# Patient Record
Sex: FEMALE | Race: BLACK OR AFRICAN AMERICAN | NOT HISPANIC OR LATINO
[De-identification: names, ages, dates, MRNs, and addresses within clinical notes are randomized per-mention and may not be internally consistent; named-entity substitution may affect disease eponyms.]

---

## 2018-09-04 ENCOUNTER — TRANSCRIPTION ENCOUNTER (OUTPATIENT)
Age: 33
End: 2018-09-04

## 2018-12-20 ENCOUNTER — TRANSCRIPTION ENCOUNTER (OUTPATIENT)
Age: 33
End: 2018-12-20

## 2018-12-26 PROBLEM — Z00.00 ENCOUNTER FOR PREVENTIVE HEALTH EXAMINATION: Status: ACTIVE | Noted: 2018-12-26

## 2018-12-27 ENCOUNTER — APPOINTMENT (OUTPATIENT)
Dept: COLORECTAL SURGERY | Facility: CLINIC | Age: 33
End: 2018-12-27
Payer: MEDICAID

## 2018-12-27 VITALS
BODY MASS INDEX: 21.67 KG/M2 | OXYGEN SATURATION: 100 % | WEIGHT: 143 LBS | RESPIRATION RATE: 15 BRPM | HEART RATE: 71 BPM | DIASTOLIC BLOOD PRESSURE: 76 MMHG | HEIGHT: 68 IN | SYSTOLIC BLOOD PRESSURE: 120 MMHG

## 2018-12-27 DIAGNOSIS — K60.2 ANAL FISSURE, UNSPECIFIED: ICD-10-CM

## 2018-12-27 PROCEDURE — 99204 OFFICE O/P NEW MOD 45 MIN: CPT | Mod: 25

## 2018-12-27 PROCEDURE — 46600 DIAGNOSTIC ANOSCOPY SPX: CPT

## 2018-12-27 RX ORDER — NITROGLYCERIN 20 MG/G
2 OINTMENT TOPICAL
Qty: 1 | Refills: 3 | Status: ACTIVE | COMMUNITY
Start: 2018-12-27 | End: 1900-01-01

## 2018-12-29 RX ORDER — LIDOCAINE 5 G/100G
5 OINTMENT TOPICAL
Qty: 30 | Refills: 0 | Status: ACTIVE | COMMUNITY
Start: 2018-12-29 | End: 1900-01-01

## 2020-02-17 ENCOUNTER — TRANSCRIPTION ENCOUNTER (OUTPATIENT)
Age: 35
End: 2020-02-17

## 2020-02-28 ENCOUNTER — TRANSCRIPTION ENCOUNTER (OUTPATIENT)
Age: 35
End: 2020-02-28

## 2020-08-20 ENCOUNTER — TRANSCRIPTION ENCOUNTER (OUTPATIENT)
Age: 35
End: 2020-08-20

## 2021-11-09 ENCOUNTER — TRANSCRIPTION ENCOUNTER (OUTPATIENT)
Age: 36
End: 2021-11-09

## 2022-01-10 ENCOUNTER — TRANSCRIPTION ENCOUNTER (OUTPATIENT)
Age: 37
End: 2022-01-10

## 2022-01-12 ENCOUNTER — TRANSCRIPTION ENCOUNTER (OUTPATIENT)
Age: 37
End: 2022-01-12

## 2022-11-16 ENCOUNTER — APPOINTMENT (OUTPATIENT)
Dept: FAMILY MEDICINE | Facility: CLINIC | Age: 37
End: 2022-11-16

## 2024-04-16 ENCOUNTER — OUTPATIENT (OUTPATIENT)
Dept: OUTPATIENT SERVICES | Facility: HOSPITAL | Age: 39
LOS: 1 days | End: 2024-04-16

## 2024-04-16 VITALS
HEIGHT: 67.5 IN | RESPIRATION RATE: 16 BRPM | WEIGHT: 154.1 LBS | SYSTOLIC BLOOD PRESSURE: 98 MMHG | TEMPERATURE: 98 F | HEART RATE: 74 BPM | DIASTOLIC BLOOD PRESSURE: 66 MMHG | OXYGEN SATURATION: 98 %

## 2024-04-16 DIAGNOSIS — Z98.890 OTHER SPECIFIED POSTPROCEDURAL STATES: Chronic | ICD-10-CM

## 2024-04-16 DIAGNOSIS — D25.9 LEIOMYOMA OF UTERUS, UNSPECIFIED: ICD-10-CM

## 2024-04-16 LAB
BLD GP AB SCN SERPL QL: NEGATIVE — SIGNIFICANT CHANGE UP
HCG UR QL: NEGATIVE — SIGNIFICANT CHANGE UP
HCT VFR BLD CALC: 39.7 % — SIGNIFICANT CHANGE UP (ref 34.5–45)
HGB BLD-MCNC: 13 G/DL — SIGNIFICANT CHANGE UP (ref 11.5–15.5)
MCHC RBC-ENTMCNC: 30.7 PG — SIGNIFICANT CHANGE UP (ref 27–34)
MCHC RBC-ENTMCNC: 32.7 GM/DL — SIGNIFICANT CHANGE UP (ref 32–36)
MCV RBC AUTO: 93.9 FL — SIGNIFICANT CHANGE UP (ref 80–100)
NRBC # BLD: 0 /100 WBCS — SIGNIFICANT CHANGE UP (ref 0–0)
NRBC # FLD: 0 K/UL — SIGNIFICANT CHANGE UP (ref 0–0)
PLATELET # BLD AUTO: 218 K/UL — SIGNIFICANT CHANGE UP (ref 150–400)
RBC # BLD: 4.23 M/UL — SIGNIFICANT CHANGE UP (ref 3.8–5.2)
RBC # FLD: 13.3 % — SIGNIFICANT CHANGE UP (ref 10.3–14.5)
RH IG SCN BLD-IMP: POSITIVE — SIGNIFICANT CHANGE UP
RH IG SCN BLD-IMP: POSITIVE — SIGNIFICANT CHANGE UP
WBC # BLD: 5.42 K/UL — SIGNIFICANT CHANGE UP (ref 3.8–10.5)
WBC # FLD AUTO: 5.42 K/UL — SIGNIFICANT CHANGE UP (ref 3.8–10.5)

## 2024-04-16 RX ORDER — SODIUM CHLORIDE 9 MG/ML
1000 INJECTION, SOLUTION INTRAVENOUS
Refills: 0 | Status: DISCONTINUED | OUTPATIENT
Start: 2024-04-18 | End: 2024-04-18

## 2024-04-16 NOTE — H&P PST ADULT - NSICDXPASTMEDICALHX_GEN_ALL_CORE_FT
PAST MEDICAL HISTORY:  Uterine fibroid      PAST MEDICAL HISTORY:  H/O skin disorder     Uterine fibroid

## 2024-04-16 NOTE — H&P PST ADULT - GENITOURINARY COMMENTS
Hx uterine fibroid increased in size with increased bleeding and cramping with menses - pt has difficulty becoming pregnant not examined

## 2024-04-16 NOTE — H&P PST ADULT - HISTORY OF PRESENT ILLNESS
Pt is a 38 yr old female scheduled for Abdominal Myomectomy  Pt is a 38 yr old female scheduled for Abdominal Myomectomy and Chromotubation and Removal of any Disease Tissue tentatively 4/18/24 - pt with hx fibroid noted yrs ago - now desires pregnancy - pt c/o of increase in size of fibroid and increased bleeding and cramping with menses

## 2024-04-17 ENCOUNTER — TRANSCRIPTION ENCOUNTER (OUTPATIENT)
Age: 39
End: 2024-04-17

## 2024-04-17 NOTE — ASU PATIENT PROFILE, ADULT - FALL HARM RISK - UNIVERSAL INTERVENTIONS
Bed in lowest position, wheels locked, appropriate side rails in place/Call bell, personal items and telephone in reach/Instruct patient to call for assistance before getting out of bed or chair/Non-slip footwear when patient is out of bed/[place] to call system/Physically safe environment - no spills, clutter or unnecessary equipment/Purposeful Proactive Rounding/Room/bathroom lighting operational, light cord in reach

## 2024-04-17 NOTE — ASU PATIENT PROFILE, ADULT - INTERNATIONAL TRAVEL
Afib  s/p PPM  Ascites, malignant  from advanced ovarian cancer  Asthma    Colon cancer    DM Type 2 (Diabetes Mellitus, Type 2)    HTN (Hypertension)    Hypothyroidism    Obese    Ovarian cancer    Overactive Bladder
No

## 2024-04-18 ENCOUNTER — INPATIENT (INPATIENT)
Facility: HOSPITAL | Age: 39
LOS: 1 days | Discharge: HOME | End: 2024-04-20
Attending: STUDENT IN AN ORGANIZED HEALTH CARE EDUCATION/TRAINING PROGRAM | Admitting: STUDENT IN AN ORGANIZED HEALTH CARE EDUCATION/TRAINING PROGRAM
Payer: COMMERCIAL

## 2024-04-18 VITALS
OXYGEN SATURATION: 100 % | RESPIRATION RATE: 16 BRPM | HEIGHT: 67 IN | WEIGHT: 154.1 LBS | TEMPERATURE: 99 F | DIASTOLIC BLOOD PRESSURE: 61 MMHG | HEART RATE: 80 BPM | SYSTOLIC BLOOD PRESSURE: 100 MMHG

## 2024-04-18 DIAGNOSIS — Z98.890 OTHER SPECIFIED POSTPROCEDURAL STATES: Chronic | ICD-10-CM

## 2024-04-18 DIAGNOSIS — D25.9 LEIOMYOMA OF UTERUS, UNSPECIFIED: ICD-10-CM

## 2024-04-18 LAB
HCG UR QL: NEGATIVE — SIGNIFICANT CHANGE UP
HCT VFR BLD CALC: 33.6 % — LOW (ref 34.5–45)
HGB BLD-MCNC: 11.2 G/DL — LOW (ref 11.5–15.5)

## 2024-04-18 PROCEDURE — 88305 TISSUE EXAM BY PATHOLOGIST: CPT | Mod: 26

## 2024-04-18 DEVICE — MYOSURE TISSUE REMOVAL DEVICE XL: Type: IMPLANTABLE DEVICE | Status: FUNCTIONAL

## 2024-04-18 DEVICE — INTERCEED 5 X 6" XL: Type: IMPLANTABLE DEVICE | Status: FUNCTIONAL

## 2024-04-18 DEVICE — VISTASEAL FIBRIN HUMAN 10ML: Type: IMPLANTABLE DEVICE | Status: FUNCTIONAL

## 2024-04-18 RX ORDER — OXYCODONE HYDROCHLORIDE 5 MG/1
10 TABLET ORAL EVERY 4 HOURS
Refills: 0 | Status: DISCONTINUED | OUTPATIENT
Start: 2024-04-18 | End: 2024-04-18

## 2024-04-18 RX ORDER — OXYCODONE HYDROCHLORIDE 5 MG/1
5 TABLET ORAL ONCE
Refills: 0 | Status: DISCONTINUED | OUTPATIENT
Start: 2024-04-18 | End: 2024-04-18

## 2024-04-18 RX ORDER — SODIUM CHLORIDE 9 MG/ML
1000 INJECTION, SOLUTION INTRAVENOUS
Refills: 0 | Status: DISCONTINUED | OUTPATIENT
Start: 2024-04-18 | End: 2024-04-19

## 2024-04-18 RX ORDER — HEPARIN SODIUM 5000 [USP'U]/ML
5000 INJECTION INTRAVENOUS; SUBCUTANEOUS EVERY 12 HOURS
Refills: 0 | Status: DISCONTINUED | OUTPATIENT
Start: 2024-04-18 | End: 2024-04-20

## 2024-04-18 RX ORDER — ONDANSETRON 8 MG/1
4 TABLET, FILM COATED ORAL ONCE
Refills: 0 | Status: DISCONTINUED | OUTPATIENT
Start: 2024-04-18 | End: 2024-04-18

## 2024-04-18 RX ORDER — OXYCODONE HYDROCHLORIDE 5 MG/1
5 TABLET ORAL EVERY 6 HOURS
Refills: 0 | Status: DISCONTINUED | OUTPATIENT
Start: 2024-04-18 | End: 2024-04-18

## 2024-04-18 RX ORDER — OXYCODONE HYDROCHLORIDE 5 MG/1
5 TABLET ORAL EVERY 6 HOURS
Refills: 0 | Status: DISCONTINUED | OUTPATIENT
Start: 2024-04-18 | End: 2024-04-20

## 2024-04-18 RX ORDER — SIMETHICONE 80 MG/1
80 TABLET, CHEWABLE ORAL EVERY 6 HOURS
Refills: 0 | Status: DISCONTINUED | OUTPATIENT
Start: 2024-04-18 | End: 2024-04-20

## 2024-04-18 RX ORDER — ONDANSETRON 8 MG/1
4 TABLET, FILM COATED ORAL EVERY 6 HOURS
Refills: 0 | Status: DISCONTINUED | OUTPATIENT
Start: 2024-04-18 | End: 2024-04-18

## 2024-04-18 RX ORDER — OXYCODONE HYDROCHLORIDE 5 MG/1
5 TABLET ORAL
Refills: 0 | Status: DISCONTINUED | OUTPATIENT
Start: 2024-04-18 | End: 2024-04-18

## 2024-04-18 RX ORDER — FENTANYL CITRATE 50 UG/ML
50 INJECTION INTRAVENOUS
Refills: 0 | Status: DISCONTINUED | OUTPATIENT
Start: 2024-04-18 | End: 2024-04-18

## 2024-04-18 RX ORDER — ONDANSETRON 8 MG/1
4 TABLET, FILM COATED ORAL EVERY 6 HOURS
Refills: 0 | Status: DISCONTINUED | OUTPATIENT
Start: 2024-04-18 | End: 2024-04-20

## 2024-04-18 RX ORDER — SODIUM CHLORIDE 9 MG/ML
1000 INJECTION, SOLUTION INTRAVENOUS
Refills: 0 | Status: DISCONTINUED | OUTPATIENT
Start: 2024-04-18 | End: 2024-04-18

## 2024-04-18 RX ORDER — KETOROLAC TROMETHAMINE 30 MG/ML
30 SYRINGE (ML) INJECTION EVERY 8 HOURS
Refills: 0 | Status: DISCONTINUED | OUTPATIENT
Start: 2024-04-18 | End: 2024-04-18

## 2024-04-18 RX ORDER — SENNA PLUS 8.6 MG/1
2 TABLET ORAL AT BEDTIME
Refills: 0 | Status: DISCONTINUED | OUTPATIENT
Start: 2024-04-18 | End: 2024-04-20

## 2024-04-18 RX ORDER — KETOROLAC TROMETHAMINE 30 MG/ML
15 SYRINGE (ML) INJECTION EVERY 6 HOURS
Refills: 0 | Status: DISCONTINUED | OUTPATIENT
Start: 2024-04-18 | End: 2024-04-19

## 2024-04-18 RX ORDER — ACETAMINOPHEN 500 MG
1000 TABLET ORAL EVERY 8 HOURS
Refills: 0 | Status: DISCONTINUED | OUTPATIENT
Start: 2024-04-18 | End: 2024-04-18

## 2024-04-18 RX ORDER — ACETAMINOPHEN 500 MG
1000 TABLET ORAL ONCE
Refills: 0 | Status: COMPLETED | OUTPATIENT
Start: 2024-04-18 | End: 2024-04-18

## 2024-04-18 RX ADMIN — Medication 1000 MILLIGRAM(S): at 18:25

## 2024-04-18 RX ADMIN — Medication 30 MILLIGRAM(S): at 13:40

## 2024-04-18 RX ADMIN — Medication 15 MILLIGRAM(S): at 17:52

## 2024-04-18 RX ADMIN — FENTANYL CITRATE 50 MICROGRAM(S): 50 INJECTION INTRAVENOUS at 13:50

## 2024-04-18 RX ADMIN — FENTANYL CITRATE 50 MICROGRAM(S): 50 INJECTION INTRAVENOUS at 13:39

## 2024-04-18 RX ADMIN — Medication 15 MILLIGRAM(S): at 18:25

## 2024-04-18 RX ADMIN — SIMETHICONE 80 MILLIGRAM(S): 80 TABLET, CHEWABLE ORAL at 23:45

## 2024-04-18 RX ADMIN — FENTANYL CITRATE 50 MICROGRAM(S): 50 INJECTION INTRAVENOUS at 13:30

## 2024-04-18 RX ADMIN — HEPARIN SODIUM 5000 UNIT(S): 5000 INJECTION INTRAVENOUS; SUBCUTANEOUS at 17:52

## 2024-04-18 RX ADMIN — Medication 400 MILLIGRAM(S): at 17:51

## 2024-04-18 RX ADMIN — Medication 15 MILLIGRAM(S): at 23:45

## 2024-04-18 RX ADMIN — SIMETHICONE 80 MILLIGRAM(S): 80 TABLET, CHEWABLE ORAL at 17:52

## 2024-04-18 RX ADMIN — SODIUM CHLORIDE 125 MILLILITER(S): 9 INJECTION, SOLUTION INTRAVENOUS at 13:21

## 2024-04-18 RX ADMIN — SODIUM CHLORIDE 125 MILLILITER(S): 9 INJECTION, SOLUTION INTRAVENOUS at 22:25

## 2024-04-18 RX ADMIN — Medication 30 MILLIGRAM(S): at 13:50

## 2024-04-18 RX ADMIN — OXYCODONE HYDROCHLORIDE 5 MILLIGRAM(S): 5 TABLET ORAL at 15:02

## 2024-04-18 RX ADMIN — FENTANYL CITRATE 50 MICROGRAM(S): 50 INJECTION INTRAVENOUS at 13:20

## 2024-04-18 RX ADMIN — SENNA PLUS 2 TABLET(S): 8.6 TABLET ORAL at 21:29

## 2024-04-18 NOTE — BRIEF OPERATIVE NOTE - OPERATION/FINDINGS
Exam under anesthesia demonstrated a bulky fibroid uterus ~ 18w in size. Large posterior fibroid deep within the pelvis. No palpable adnexal masses.   Abdominal survey demonstrated broad 12 x8 degenerating fibroid spanning the anterior / posterior aspect of the left uterus.    Chromotubation preformed. Left tube open and patent with spillage. Right tube with distension, preformed under pressure. No spillage noted. Anatomy of bilateral fallopian tubes was normal.   Hysteroscopic evaluation of the cavity demonstrated a tortutous cavity. 2 cm fibroid noted along anterior lower uterine segment, 2cm fibroid along left anterior fundal wall.   Exam under anesthesia demonstrated a bulky fibroid uterus ~ 18w in size. Large posterior fibroid deep within the pelvis. No palpable adnexal masses.   Abdominal survey demonstrated broad 12 x8 degenerating fibroid spanning the anterior / posterior aspect of the left uterus. 2 additional fibroids removed deep to this serosal incision.   Right anterior cornua with 4x5cm fibroid at the level of the round ligament. 3 additional fibroids removed here. Total of 12 fibroids removed from the abdominal approach.   Operative sites were covered with Vistaseal with excellent hemostasis. Interceed placed atop posterior / anterior aspects of the uterus.   Chromotubation preformed. Left tube open and patent with spillage. Right tube with distension, preformed under pressure. No spillage noted. Anatomy of bilateral fallopian tubes was normal.   Hysteroscopic evaluation of the cavity demonstrated a tortuous cavity distended from fibroids.  2 cm fibroid noted along anterior lower uterine segment, 2cm fibroid along left anterior fundal wall. Posterior polyp noted. Left ostia pinpoint. Right ostia clear.   Cavity was not entered however due to extensive dissection, patient is recommended to have a  delivery for any future pregnancies.    Exam under anesthesia demonstrated a bulky fibroid uterus ~ 18w in size. Large posterior fibroid deep within the pelvis. No palpable adnexal masses.   Abdominal survey demonstrated broad 12 x8 degenerating fibroid spanning the anterior / posterior aspect of the left uterus. 2 additional fibroids removed deep to this serosal incision.   4.5cm right anterior subserosal fibroid noted near the level of the round ligament. 3 additional fibroids removed here. Total of 12 fibroids removed from the abdominal approach.   Operative sites were covered with Vistaseal with excellent hemostasis. Interceed placed atop posterior / anterior aspects of the uterus.   Chromotubation preformed. Left tube open and patent with spillage. Right tube with distension, preformed under pressure. No spillage noted. gross anatomy of bilateral fallopian tubes was normal.   Hysteroscopic evaluation of the cavity demonstrated a tortuous cavity distorted from fibroids.  2 cm fibroid noted along anterior lower uterine segment, 2cm fibroid along left anterior fundal wall. Posterior polyp noted. Left ostia pinpoint. Right ostia clear.   Cavity was not entered however due to extensive distention, patient is recommended to have a  delivery for any future pregnancies.

## 2024-04-18 NOTE — PRE-OP CHECKLIST - ISOLATION PRECAUTIONS
I called and spoke with patient in regards to her 4/20/2021 appointment. I stated I just got news that Rosi will not be in office and that we need to reschedule this appointment. Patient declined rescheduling.    none

## 2024-04-18 NOTE — CHART NOTE - NSCHARTNOTEFT_GEN_A_CORE
S: Patient seen and evaluated at bedside.  Pt awake and alert resting comfortably in bed. Patient reports cramping pain, responsive to IV pain medication. Not yet attempting clears. Pt denies N/V, SOB, CP, palpitations, fever/chills. Not OOB yet. Greenberg catheter in place.     O:   T(C): 36.8 (04-18-24 @ 17:48), Max: 36.8 (04-18-24 @ 17:48)  HR: 84 (04-18-24 @ 17:48) (80 - 90)  BP: 106/60 (04-18-24 @ 17:48) (106/60 - 109/64)  RR: 17 (04-18-24 @ 17:48) (17 - 20)  SpO2: 100% (04-18-24 @ 17:48) (99% - 100%)  Wt(kg): --  I&O's Summary    18 Apr 2024 07:01  -  18 Apr 2024 18:38  --------------------------------------------------------  IN: 995 mL / OUT: 685 mL / NET: 310 mL      Gen: Resting comfortably in bed, NAD  CV: Regular   Lungs: Non labored breathing. Saturating well on RA   Abd: soft, appropriately tender,   Inc: Clean/dry/intact w/ Dermabond in place  Greenberg draining yellow urine   Pad 20% saturated.   Ext: SCD's in place and functional, non-tender b/l, no edema    Labs:             13.0   5.42  )-----------( 218      ( 04-16 @ 19:39 )             39.7         MEDICATIONS  (STANDING):  heparin   Injectable 5000 Unit(s) SubCutaneous every 12 hours  ketorolac   Injectable 15 milliGRAM(s) IV Push every 6 hours  lactated ringers. 1000 milliLiter(s) (125 mL/Hr) IV Continuous <Continuous>  senna 2 Tablet(s) Oral at bedtime  simethicone 80 milliGRAM(s) Chew every 6 hours    MEDICATIONS  (PRN):  ondansetron Injectable 4 milliGRAM(s) IV Push every 6 hours PRN Nausea and/or Vomiting  oxyCODONE    IR 5 milliGRAM(s) Oral every 6 hours PRN Severe Pain (7 - 10)      A/P: 38y Female s/p abdominal myomectomy, chromotubation, and hysteroscopic myomectomy     Neuro: IV pain medications with Tylenol and Toradol. Oxycodone PRN for break through pain. s/p Expiril injection.   CV: Hemodynamically stable.  Monitor VS. CBC this afternoon and this AM.   Pulm: Saturating well on room air.  Encourage OOB and incentive spirometer use.   GI: Advance to regular diet. Anti-emetics PRN.  : Greenberg removed. DTV by 11pm  FEN: Electrolytes: LR@125cc/hr - SLIV once patient is tolerating clears / voiding   Heme: DVT ppx w/ SCD's while in bed. Early ambulation, initially with assistance then as tolerated.  Continue HSQ   ID: Afebrile  Dispo: Patient cleared for the floor   Social: Mother, partner, and patient aware of the course and findings of the surgery.      Mikki Hill, PGY-3

## 2024-04-18 NOTE — PRE-OP CHECKLIST - ALLERGIES REVIEWED
The patient feels that the cataract is significantly impacting daily activities and has elected cataract surgery. The risks, benefits, and alternatives to surgery were discussed. The patient elects to proceed with surgery. done

## 2024-04-18 NOTE — PATIENT PROFILE ADULT - FALL HARM RISK - HARM RISK INTERVENTIONS

## 2024-04-18 NOTE — CHART NOTE - NSCHARTNOTEFT_GEN_A_CORE
2339    Myself at the bedside to evaluate the patient after being informed of RN Elaine of patient's concern of vaginal bleeding as well as BP of 90/40-50s    Patient reports concerns about the amount of vaginal bleeding that she noticed/experienced when she went to go void. Patient had last got up to void at 830p. She was concerned regarding a "stream" of bleeding. She denies any current light-headedness, dizziness, or any other complaints     Vital Signs Last 24 Hrs  T(C): 37 (18 Apr 2024 21:53), Max: 37.3 (18 Apr 2024 06:12)  T(F): 98.6 (18 Apr 2024 21:53), Max: 99.1 (18 Apr 2024 06:12)  HR: 84 (18 Apr 2024 21:53) (69 - 90)  BP: 102/55 (18 Apr 2024 21:53) (92/49 - 115/66)  BP(mean): 74 (18 Apr 2024 14:45) (56 - 94)  RR: 17 (18 Apr 2024 21:53) (12 - 20)  SpO2: 99% (18 Apr 2024 21:53) (95% - 100%)    Parameters below as of 18 Apr 2024 21:53  Patient On (Oxygen Delivery Method): room air        Gen: No acute distress. Awake. Alert  CV: Regular rate and rhythm. No murmurs appreciated  Pulm: Clear to auscultation bilaterally. No respiratory distress. No wheezes, rales, or rhonchi  Abd: Soft. Diffusely tender, but appropriate given post-op. No rebound or guarding  Extremities: SCDs in place bilaterally 2177    Myself at the bedside to evaluate the patient after being informed of RN Elaine of patient's concern of vaginal bleeding as well as BP of 90/40-50s    Patient reports concerns about the amount of vaginal bleeding that she noticed/experienced when she went to go void. Patient had last got up to void at 830p. She was concerned regarding a "stream" of bleeding. She denies any current light-headedness, dizziness, or any other complaints. Pain controlled     Vital Signs Last 24 Hrs  T(C): 37 (18 Apr 2024 21:53), Max: 37.3 (18 Apr 2024 06:12)  T(F): 98.6 (18 Apr 2024 21:53), Max: 99.1 (18 Apr 2024 06:12)  HR: 84 (18 Apr 2024 21:53) (69 - 90)  BP: 102/55 (18 Apr 2024 21:53) (92/49 - 115/66)  BP(mean): 74 (18 Apr 2024 14:45) (56 - 94)  RR: 17 (18 Apr 2024 21:53) (12 - 20)  SpO2: 99% (18 Apr 2024 21:53) (95% - 100%)    Parameters below as of 18 Apr 2024 21:53  Patient On (Oxygen Delivery Method): room air    Gen: No acute distress. Awake. Alert  Pulm: Unlabored breathing. No respiratory distress  Abd: Soft. Diffusely tender, but appropriate given post-op. No rebound or guarding   (w/ RN Elaine as chaperone): Streaks of blood in the toilet as well as streaks of blood on the patient's pad (had on for ~3hrs) in the rubbish bin. Current pad w/ half-dollar size saturated are of blood. No active bleeding  Extremities: SCDs in place bilaterally    37yo s/p abdominal myomectomy, chromotubation, and hysteroscopic myomectomy who was evaluated given the patient's concern of her amount of vaginal bleeding. I reviewed with the patient that I am not concerned with her amount of vaginal bleeding and that some vaginal bleeding is expected given her procedures. I reviewed that her VS are otherwise reassuring, she is having good urine output, and that her post-op CBC was reassuring/not markedly low compared to prior. I reviewed what would be considered a significant amount of bleeding (saturating a pad through-and-through) and that pad counts were be continued. Given the opportunity to ask questions and have concerns addressed. All questions were answered to the patient's apparent satisfaction     - c/w routine post-op care    Will Barnard  PGY-2, Obstetrics & Gynecology

## 2024-04-19 ENCOUNTER — TRANSCRIPTION ENCOUNTER (OUTPATIENT)
Age: 39
End: 2024-04-19

## 2024-04-19 LAB
ALBUMIN SERPL ELPH-MCNC: 3.1 G/DL — LOW (ref 3.3–5)
ALP SERPL-CCNC: 32 U/L — LOW (ref 40–120)
ALT FLD-CCNC: 11 U/L — SIGNIFICANT CHANGE UP (ref 4–33)
ANION GAP SERPL CALC-SCNC: 11 MMOL/L — SIGNIFICANT CHANGE UP (ref 7–14)
AST SERPL-CCNC: 26 U/L — SIGNIFICANT CHANGE UP (ref 4–32)
BASOPHILS # BLD AUTO: 0.01 K/UL — SIGNIFICANT CHANGE UP (ref 0–0.2)
BASOPHILS # BLD AUTO: 0.02 K/UL — SIGNIFICANT CHANGE UP (ref 0–0.2)
BASOPHILS NFR BLD AUTO: 0.1 % — SIGNIFICANT CHANGE UP (ref 0–2)
BASOPHILS NFR BLD AUTO: 0.1 % — SIGNIFICANT CHANGE UP (ref 0–2)
BILIRUB SERPL-MCNC: 0.6 MG/DL — SIGNIFICANT CHANGE UP (ref 0.2–1.2)
BUN SERPL-MCNC: 6 MG/DL — LOW (ref 7–23)
CALCIUM SERPL-MCNC: 7.4 MG/DL — LOW (ref 8.4–10.5)
CHLORIDE SERPL-SCNC: 107 MMOL/L — SIGNIFICANT CHANGE UP (ref 98–107)
CO2 SERPL-SCNC: 20 MMOL/L — LOW (ref 22–31)
CREAT SERPL-MCNC: 0.68 MG/DL — SIGNIFICANT CHANGE UP (ref 0.5–1.3)
EGFR: 113 ML/MIN/1.73M2 — SIGNIFICANT CHANGE UP
EOSINOPHIL # BLD AUTO: 0 K/UL — SIGNIFICANT CHANGE UP (ref 0–0.5)
EOSINOPHIL # BLD AUTO: 0.02 K/UL — SIGNIFICANT CHANGE UP (ref 0–0.5)
EOSINOPHIL NFR BLD AUTO: 0 % — SIGNIFICANT CHANGE UP (ref 0–6)
EOSINOPHIL NFR BLD AUTO: 0.1 % — SIGNIFICANT CHANGE UP (ref 0–6)
GLUCOSE SERPL-MCNC: 100 MG/DL — HIGH (ref 70–99)
HCT VFR BLD CALC: 28.2 % — LOW (ref 34.5–45)
HCT VFR BLD CALC: 29.6 % — LOW (ref 34.5–45)
HGB BLD-MCNC: 9.4 G/DL — LOW (ref 11.5–15.5)
HGB BLD-MCNC: 9.9 G/DL — LOW (ref 11.5–15.5)
IANC: 10.62 K/UL — HIGH (ref 1.8–7.4)
IANC: 13.89 K/UL — HIGH (ref 1.8–7.4)
IMM GRANULOCYTES NFR BLD AUTO: 0.4 % — SIGNIFICANT CHANGE UP (ref 0–0.9)
IMM GRANULOCYTES NFR BLD AUTO: 0.6 % — SIGNIFICANT CHANGE UP (ref 0–0.9)
LYMPHOCYTES # BLD AUTO: 1.37 K/UL — SIGNIFICANT CHANGE UP (ref 1–3.3)
LYMPHOCYTES # BLD AUTO: 1.85 K/UL — SIGNIFICANT CHANGE UP (ref 1–3.3)
LYMPHOCYTES # BLD AUTO: 13.7 % — SIGNIFICANT CHANGE UP (ref 13–44)
LYMPHOCYTES # BLD AUTO: 8.4 % — LOW (ref 13–44)
MCHC RBC-ENTMCNC: 31.4 PG — SIGNIFICANT CHANGE UP (ref 27–34)
MCHC RBC-ENTMCNC: 31.6 PG — SIGNIFICANT CHANGE UP (ref 27–34)
MCHC RBC-ENTMCNC: 33.3 GM/DL — SIGNIFICANT CHANGE UP (ref 32–36)
MCHC RBC-ENTMCNC: 33.4 GM/DL — SIGNIFICANT CHANGE UP (ref 32–36)
MCV RBC AUTO: 94.3 FL — SIGNIFICANT CHANGE UP (ref 80–100)
MCV RBC AUTO: 94.6 FL — SIGNIFICANT CHANGE UP (ref 80–100)
MONOCYTES # BLD AUTO: 0.89 K/UL — SIGNIFICANT CHANGE UP (ref 0–0.9)
MONOCYTES # BLD AUTO: 1.06 K/UL — HIGH (ref 0–0.9)
MONOCYTES NFR BLD AUTO: 6.5 % — SIGNIFICANT CHANGE UP (ref 2–14)
MONOCYTES NFR BLD AUTO: 6.6 % — SIGNIFICANT CHANGE UP (ref 2–14)
NEUTROPHILS # BLD AUTO: 10.62 K/UL — HIGH (ref 1.8–7.4)
NEUTROPHILS # BLD AUTO: 13.89 K/UL — HIGH (ref 1.8–7.4)
NEUTROPHILS NFR BLD AUTO: 78.9 % — HIGH (ref 43–77)
NEUTROPHILS NFR BLD AUTO: 84.6 % — HIGH (ref 43–77)
NRBC # BLD: 0 /100 WBCS — SIGNIFICANT CHANGE UP (ref 0–0)
NRBC # BLD: 0 /100 WBCS — SIGNIFICANT CHANGE UP (ref 0–0)
NRBC # FLD: 0 K/UL — SIGNIFICANT CHANGE UP (ref 0–0)
NRBC # FLD: 0 K/UL — SIGNIFICANT CHANGE UP (ref 0–0)
PLATELET # BLD AUTO: 151 K/UL — SIGNIFICANT CHANGE UP (ref 150–400)
PLATELET # BLD AUTO: 151 K/UL — SIGNIFICANT CHANGE UP (ref 150–400)
POTASSIUM SERPL-MCNC: 3.5 MMOL/L — SIGNIFICANT CHANGE UP (ref 3.5–5.3)
POTASSIUM SERPL-SCNC: 3.5 MMOL/L — SIGNIFICANT CHANGE UP (ref 3.5–5.3)
PROT SERPL-MCNC: 5 G/DL — LOW (ref 6–8.3)
RBC # BLD: 2.99 M/UL — LOW (ref 3.8–5.2)
RBC # BLD: 3.13 M/UL — LOW (ref 3.8–5.2)
RBC # FLD: 13.5 % — SIGNIFICANT CHANGE UP (ref 10.3–14.5)
RBC # FLD: 13.7 % — SIGNIFICANT CHANGE UP (ref 10.3–14.5)
SODIUM SERPL-SCNC: 138 MMOL/L — SIGNIFICANT CHANGE UP (ref 135–145)
WBC # BLD: 13.48 K/UL — HIGH (ref 3.8–10.5)
WBC # BLD: 16.4 K/UL — HIGH (ref 3.8–10.5)
WBC # FLD AUTO: 13.48 K/UL — HIGH (ref 3.8–10.5)
WBC # FLD AUTO: 16.4 K/UL — HIGH (ref 3.8–10.5)

## 2024-04-19 RX ORDER — SIMETHICONE 80 MG/1
1 TABLET, CHEWABLE ORAL
Qty: 0 | Refills: 0 | DISCHARGE
Start: 2024-04-19

## 2024-04-19 RX ORDER — ACETAMINOPHEN 500 MG
3 TABLET ORAL
Qty: 0 | Refills: 0 | DISCHARGE
Start: 2024-04-19

## 2024-04-19 RX ORDER — IBUPROFEN 200 MG
600 TABLET ORAL EVERY 6 HOURS
Refills: 0 | Status: DISCONTINUED | OUTPATIENT
Start: 2024-04-19 | End: 2024-04-20

## 2024-04-19 RX ORDER — IBUPROFEN 200 MG
1 TABLET ORAL
Qty: 0 | Refills: 0 | DISCHARGE
Start: 2024-04-19

## 2024-04-19 RX ORDER — OXYCODONE HYDROCHLORIDE 5 MG/1
1 TABLET ORAL
Qty: 6 | Refills: 0
Start: 2024-04-19

## 2024-04-19 RX ORDER — SENNA PLUS 8.6 MG/1
2 TABLET ORAL
Qty: 0 | Refills: 0 | DISCHARGE
Start: 2024-04-19

## 2024-04-19 RX ORDER — ACETAMINOPHEN 500 MG
975 TABLET ORAL EVERY 6 HOURS
Refills: 0 | Status: DISCONTINUED | OUTPATIENT
Start: 2024-04-19 | End: 2024-04-20

## 2024-04-19 RX ADMIN — Medication 975 MILLIGRAM(S): at 17:16

## 2024-04-19 RX ADMIN — SIMETHICONE 80 MILLIGRAM(S): 80 TABLET, CHEWABLE ORAL at 23:43

## 2024-04-19 RX ADMIN — Medication 975 MILLIGRAM(S): at 23:22

## 2024-04-19 RX ADMIN — Medication 975 MILLIGRAM(S): at 16:38

## 2024-04-19 RX ADMIN — SIMETHICONE 80 MILLIGRAM(S): 80 TABLET, CHEWABLE ORAL at 13:19

## 2024-04-19 RX ADMIN — Medication 15 MILLIGRAM(S): at 05:41

## 2024-04-19 RX ADMIN — Medication 600 MILLIGRAM(S): at 12:53

## 2024-04-19 RX ADMIN — Medication 975 MILLIGRAM(S): at 11:16

## 2024-04-19 RX ADMIN — SENNA PLUS 2 TABLET(S): 8.6 TABLET ORAL at 22:23

## 2024-04-19 RX ADMIN — Medication 975 MILLIGRAM(S): at 09:47

## 2024-04-19 RX ADMIN — HEPARIN SODIUM 5000 UNIT(S): 5000 INJECTION INTRAVENOUS; SUBCUTANEOUS at 18:47

## 2024-04-19 RX ADMIN — Medication 975 MILLIGRAM(S): at 22:22

## 2024-04-19 RX ADMIN — Medication 15 MILLIGRAM(S): at 07:05

## 2024-04-19 RX ADMIN — HEPARIN SODIUM 5000 UNIT(S): 5000 INJECTION INTRAVENOUS; SUBCUTANEOUS at 05:42

## 2024-04-19 RX ADMIN — SODIUM CHLORIDE 125 MILLILITER(S): 9 INJECTION, SOLUTION INTRAVENOUS at 05:57

## 2024-04-19 RX ADMIN — Medication 600 MILLIGRAM(S): at 19:15

## 2024-04-19 RX ADMIN — SIMETHICONE 80 MILLIGRAM(S): 80 TABLET, CHEWABLE ORAL at 18:47

## 2024-04-19 RX ADMIN — Medication 600 MILLIGRAM(S): at 18:47

## 2024-04-19 RX ADMIN — Medication 600 MILLIGRAM(S): at 13:24

## 2024-04-19 RX ADMIN — SIMETHICONE 80 MILLIGRAM(S): 80 TABLET, CHEWABLE ORAL at 05:42

## 2024-04-19 RX ADMIN — Medication 15 MILLIGRAM(S): at 01:02

## 2024-04-19 NOTE — DISCHARGE NOTE PROVIDER - NSDCCPTREATMENT_GEN_ALL_CORE_FT
PRINCIPAL PROCEDURE  Procedure: Abdominal myomectomy  Findings and Treatment:       SECONDARY PROCEDURE  Procedure: Chromotubation, oviduct  Findings and Treatment:     Procedure: Hysteroscopic myomectomy  Findings and Treatment:

## 2024-04-19 NOTE — DISCHARGE NOTE PROVIDER - NSDCCPCAREPLAN_GEN_ALL_CORE_FT
PRINCIPAL DISCHARGE DIAGNOSIS  Diagnosis: Status post myomectomy  Assessment and Plan of Treatment:

## 2024-04-19 NOTE — DISCHARGE NOTE PROVIDER - NSDCMRMEDTOKEN_GEN_ALL_CORE_FT
acetaminophen 325 mg oral tablet: 3 tab(s) orally every 6 hours  ammonium lactate topical cream to skin rash - b/l feet:   ibuprofen 600 mg oral tablet: 1 tab(s) orally every 6 hours  multivitamin- po daily:   senna leaf extract oral tablet: 2 tab(s) orally once a day (at bedtime)  simethicone 80 mg oral tablet, chewable: 1 tab(s) orally every 6 hours  vitamin B - po daily:   vitamin C - po daily:   vitamin D - po daily:    acetaminophen 325 mg oral tablet: 3 tab(s) orally every 6 hours  ammonium lactate topical cream to skin rash - b/l feet:   ibuprofen 600 mg oral tablet: 1 tab(s) orally every 6 hours  multivitamin- po daily:   oxyCODONE 5 mg oral tablet: 1 tab(s) orally every 8 hours MDD: 3 tabs  oxyCODONE 5 mg oral tablet: 1 tab(s) orally every 6 hours MDD: 20mg  senna leaf extract oral tablet: 2 tab(s) orally once a day (at bedtime)  simethicone 80 mg oral tablet, chewable: 1 tab(s) orally every 6 hours  vitamin B - po daily:   vitamin C - po daily:   vitamin D - po daily:

## 2024-04-19 NOTE — DISCHARGE NOTE PROVIDER - CARE PROVIDER_API CALL
Lizet Bell  Obstetrics and Gynecology  [address] [place] [address]  Phone: [phone]  Fax: [phone]  Follow Up Time: 2 weeks

## 2024-04-19 NOTE — PROGRESS NOTE ADULT - ASSESSMENT
A/P: 38y s/p abdominal myomectomy, chromotubation, and hysteroscopic myomectomy. Patient is reporting pain, which is appropriate. Incision is healing well and patient is meeting appropriate post operative milestones.     Neuro: IV pain medications with Tylenol and Toradol. Oxycodone PRN for break through pain. s/p Expiril injection.   - Transition to PO meds today   CV: Hemodynamically stable.  Monitor VS  - AM CBC showing appropriate drop in H/h   Pulm: Saturating well on room air.  Encourage OOB and incentive spirometer use.   GI: Reg diet, Anti-emetics PRN.  : voiding spontaneously  FEN: SLIV, replete electrolytes PRN  Heme: HSQ and DVT ppx w/ SCD's while in bed. Early ambulation, with assistance as needed.  ID: Afebrile  Dispo: continue inpatient management    Seen with Gyn team  Simón Parra, PGY2 A/P: 38y s/p abdominal myomectomy, chromotubation, and hysteroscopic myomectomy. Patient is reporting pain, which is appropriate. Incision is healing well and patient is meeting appropriate post operative milestones.     Neuro: IV pain medications with Tylenol and Toradol. Oxycodone PRN for break through pain. s/p Expiril injection.   - Transition to PO meds today   CV: Hemodynamically stable.  Monitor VS  - AM CBC showing appropriate drop in H/h   Pulm: Saturating well on room air.  Encourage OOB and incentive spirometer use.   GI: Reg diet, Anti-emetics PRN.  : voiding spontaneously  FEN: SLIV, replete electrolytes PRN  Heme: HSQ and DVT ppx w/ SCD's while in bed. Early ambulation, with assistance as needed.  ID: Afebrile  Dispo: continue inpatient management    Seen with Gyn team  Simón Parra, PGY2    agree with above findings. Denies any complaints. Ambulating, voiding and eating regular food without any difficulty. Passed flatus. Pain is controlled. Ambulating, voiding and eating regular food without any difficulty. Reports heavy bleeding overnight but states minimal bleeding this morning. Incision c/d/i. VSS. 38y s/p EUA, abdominal myomectomy, chromotubation, and hysteroscopic myomectomy. POD1. EBL 250cc. VSS. meeting milestones.   Cont POD care  Acute blood loss anemia secondary to surgery and fibroids. h/h stable. vss. cont iron supplements  Encourage ambulation and ISS  Had a detailed conversation with patient in regards to intraop findings and recommendations. procedure was uncomplicated and myomectomy was successful. Hysteroscopy revealed pinpoint right ostia with possible mild scar tissue along the right side. endometrial cavity appears to be extremely long and distorted most likely secondary to weight of fibroid and lastly R fallopian tube appeared grossly normal but chromotubation yielded no spillage of methylene blue within R fallopian tube. Pt was re-counseled to seek consultation with [facility] for evaluation for fertility and   Anticipate discharge tomorrow  Discharge instructions and precautions discussed with patient   Lizet Bell MD MPH

## 2024-04-19 NOTE — DISCHARGE NOTE PROVIDER - HOSPITAL COURSE
38y yo F s/p abdominal myomectomy, chromotubation, and hysteroscopic myomectomy on (04/18).   POD #0, pt was advanced to a regular diet, bazan was discontinued and pt was able to void spontaneously. Pt admitted for continued pain control and observation  POD#1, No acute overnight event. Vitals stable. hemodynamically stable. Pt meeting postoperative milestones  POD#2, pt was discharged in stable condition, ambulating, tolerating po and voiding spontaneously. Pt to have close follow-up w/ Dr. Bell in clinic.

## 2024-04-20 ENCOUNTER — TRANSCRIPTION ENCOUNTER (OUTPATIENT)
Age: 39
End: 2024-04-20

## 2024-04-20 VITALS
HEART RATE: 97 BPM | SYSTOLIC BLOOD PRESSURE: 102 MMHG | RESPIRATION RATE: 20 BRPM | OXYGEN SATURATION: 100 % | TEMPERATURE: 98 F | DIASTOLIC BLOOD PRESSURE: 55 MMHG

## 2024-04-20 LAB
BASOPHILS # BLD AUTO: 0.02 K/UL — SIGNIFICANT CHANGE UP (ref 0–0.2)
BASOPHILS NFR BLD AUTO: 0.2 % — SIGNIFICANT CHANGE UP (ref 0–2)
EOSINOPHIL # BLD AUTO: 0.12 K/UL — SIGNIFICANT CHANGE UP (ref 0–0.5)
EOSINOPHIL NFR BLD AUTO: 1.2 % — SIGNIFICANT CHANGE UP (ref 0–6)
HCT VFR BLD CALC: 26 % — LOW (ref 34.5–45)
HCT VFR BLD CALC: 27 % — LOW (ref 34.5–45)
HGB BLD-MCNC: 8.6 G/DL — LOW (ref 11.5–15.5)
HGB BLD-MCNC: 9.2 G/DL — LOW (ref 11.5–15.5)
IANC: 7.28 K/UL — SIGNIFICANT CHANGE UP (ref 1.8–7.4)
IMM GRANULOCYTES NFR BLD AUTO: 0.5 % — SIGNIFICANT CHANGE UP (ref 0–0.9)
LYMPHOCYTES # BLD AUTO: 1.86 K/UL — SIGNIFICANT CHANGE UP (ref 1–3.3)
LYMPHOCYTES # BLD AUTO: 18.4 % — SIGNIFICANT CHANGE UP (ref 13–44)
MCHC RBC-ENTMCNC: 31.4 PG — SIGNIFICANT CHANGE UP (ref 27–34)
MCHC RBC-ENTMCNC: 31.7 PG — SIGNIFICANT CHANGE UP (ref 27–34)
MCHC RBC-ENTMCNC: 33.1 GM/DL — SIGNIFICANT CHANGE UP (ref 32–36)
MCHC RBC-ENTMCNC: 34.1 GM/DL — SIGNIFICANT CHANGE UP (ref 32–36)
MCV RBC AUTO: 93.1 FL — SIGNIFICANT CHANGE UP (ref 80–100)
MCV RBC AUTO: 94.9 FL — SIGNIFICANT CHANGE UP (ref 80–100)
MONOCYTES # BLD AUTO: 0.79 K/UL — SIGNIFICANT CHANGE UP (ref 0–0.9)
MONOCYTES NFR BLD AUTO: 7.8 % — SIGNIFICANT CHANGE UP (ref 2–14)
NEUTROPHILS # BLD AUTO: 7.28 K/UL — SIGNIFICANT CHANGE UP (ref 1.8–7.4)
NEUTROPHILS NFR BLD AUTO: 71.9 % — SIGNIFICANT CHANGE UP (ref 43–77)
NRBC # BLD: 0 /100 WBCS — SIGNIFICANT CHANGE UP (ref 0–0)
NRBC # BLD: 0 /100 WBCS — SIGNIFICANT CHANGE UP (ref 0–0)
NRBC # FLD: 0 K/UL — SIGNIFICANT CHANGE UP (ref 0–0)
NRBC # FLD: 0 K/UL — SIGNIFICANT CHANGE UP (ref 0–0)
PLATELET # BLD AUTO: 132 K/UL — LOW (ref 150–400)
PLATELET # BLD AUTO: 150 K/UL — SIGNIFICANT CHANGE UP (ref 150–400)
RBC # BLD: 2.74 M/UL — LOW (ref 3.8–5.2)
RBC # BLD: 2.9 M/UL — LOW (ref 3.8–5.2)
RBC # FLD: 13.7 % — SIGNIFICANT CHANGE UP (ref 10.3–14.5)
RBC # FLD: 13.7 % — SIGNIFICANT CHANGE UP (ref 10.3–14.5)
WBC # BLD: 10 K/UL — SIGNIFICANT CHANGE UP (ref 3.8–10.5)
WBC # BLD: 10.12 K/UL — SIGNIFICANT CHANGE UP (ref 3.8–10.5)
WBC # FLD AUTO: 10 K/UL — SIGNIFICANT CHANGE UP (ref 3.8–10.5)
WBC # FLD AUTO: 10.12 K/UL — SIGNIFICANT CHANGE UP (ref 3.8–10.5)

## 2024-04-20 RX ORDER — OXYCODONE HYDROCHLORIDE 5 MG/1
1 TABLET ORAL
Qty: 6 | Refills: 0
Start: 2024-04-20

## 2024-04-20 RX ADMIN — Medication 975 MILLIGRAM(S): at 06:05

## 2024-04-20 RX ADMIN — SIMETHICONE 80 MILLIGRAM(S): 80 TABLET, CHEWABLE ORAL at 18:05

## 2024-04-20 RX ADMIN — SIMETHICONE 80 MILLIGRAM(S): 80 TABLET, CHEWABLE ORAL at 12:11

## 2024-04-20 RX ADMIN — OXYCODONE HYDROCHLORIDE 5 MILLIGRAM(S): 5 TABLET ORAL at 06:04

## 2024-04-20 RX ADMIN — Medication 975 MILLIGRAM(S): at 05:05

## 2024-04-20 RX ADMIN — Medication 975 MILLIGRAM(S): at 13:11

## 2024-04-20 RX ADMIN — Medication 975 MILLIGRAM(S): at 18:05

## 2024-04-20 RX ADMIN — OXYCODONE HYDROCHLORIDE 5 MILLIGRAM(S): 5 TABLET ORAL at 06:42

## 2024-04-20 RX ADMIN — SIMETHICONE 80 MILLIGRAM(S): 80 TABLET, CHEWABLE ORAL at 05:06

## 2024-04-20 RX ADMIN — OXYCODONE HYDROCHLORIDE 5 MILLIGRAM(S): 5 TABLET ORAL at 12:33

## 2024-04-20 RX ADMIN — HEPARIN SODIUM 5000 UNIT(S): 5000 INJECTION INTRAVENOUS; SUBCUTANEOUS at 05:04

## 2024-04-20 RX ADMIN — OXYCODONE HYDROCHLORIDE 5 MILLIGRAM(S): 5 TABLET ORAL at 13:11

## 2024-04-20 RX ADMIN — Medication 975 MILLIGRAM(S): at 12:11

## 2024-04-20 RX ADMIN — OXYCODONE HYDROCHLORIDE 5 MILLIGRAM(S): 5 TABLET ORAL at 18:07

## 2024-04-20 NOTE — PROGRESS NOTE ADULT - SUBJECTIVE AND OBJECTIVE BOX
R2 GYN Progress Note    POD#2   HD#3    Patient seen and examined at bedside. No acute events overnight. States the Motrin gave her "upset stomach" but pain well controlled with Tylenol and Oxy PRN. Patient is ambulating and tolerating regular diet. Patient is passing flatus. Patient is voiding spontaneously. Denies CP, SOB, N/V, fevers, and chills.    Vital Signs Last 24 Hours  T(C): 36.9 (04-20-24 @ 05:11), Max: 37.4 (04-19-24 @ 17:12)  HR: 76 (04-20-24 @ 05:11) (76 - 108)  BP: 108/58 (04-20-24 @ 05:11) (101/59 - 108/58)  RR: 18 (04-20-24 @ 05:11) (16 - 18)  SpO2: 99% (04-20-24 @ 05:11) (94% - 100%)    I&O's Summary    19 Apr 2024 07:01  -  20 Apr 2024 07:00  --------------------------------------------------------  IN: 1660 mL / OUT: 1700 mL / NET: -40 mL      Physical Exam:  General: NAD  CV: extremities well perfused  Lungs: normal respiratory effort on room air  Abdomen: Soft, appropriately tender, no rebound or guarding  Incision: pfannensteil incision CDI with subcuticular closure and overlying dermabond prineo  : no bleeding on pad  Ext: No pain or swelling in lower extremities bilaterally     Labs:                        8.6    10.12 )-----------( 132      ( 20 Apr 2024 05:02 )             26.0   baso 0.2    eos 1.2    imm gran 0.5    lymph 18.4   mono 7.8    poly 71.9                         9.4    13.48 )-----------( 151      ( 19 Apr 2024 14:17 )             28.2   baso 0.1    eos 0.1    imm gran 0.6    lymph 13.7   mono 6.6    poly 78.9                         9.9    16.40 )-----------( 151      ( 19 Apr 2024 05:27 )             29.6   baso 0.1    eos 0.0    imm gran 0.4    lymph 8.4    mono 6.5    poly 84.6                         11.2   x     )-----------( x        ( 18 Apr 2024 18:30 )             33.6   baso x      eos x      imm gran x      lymph x      mono x      poly x          MEDICATIONS  (STANDING):  acetaminophen     Tablet .. 975 milliGRAM(s) Oral every 6 hours  heparin   Injectable 5000 Unit(s) SubCutaneous every 12 hours  ibuprofen  Tablet. 600 milliGRAM(s) Oral every 6 hours  senna 2 Tablet(s) Oral at bedtime  simethicone 80 milliGRAM(s) Chew every 6 hours    MEDICATIONS  (PRN):  ondansetron Injectable 4 milliGRAM(s) IV Push every 6 hours PRN Nausea and/or Vomiting  oxyCODONE    IR 5 milliGRAM(s) Oral every 6 hours PRN Severe Pain (7 - 10)  
Gyn Progress Note POD#1  HD#2    Subjective:   Pt seen and examined at bedside. Patient reports pain overnight in abdomen and in R shoulder. States she was having some difficulty taking in deep breaths, partly related to pain and to the abdominal binder. Denies passing flatus. Denies significant nausea or any episodes of vomiting and is tolerating PO intake. Did get up and ambulate to void several times overnight without significant lightheadedness or dizziness.       Objective:  T(F): 99.6 (04-19-24 @ 06:15), Max: 99.6 (04-19-24 @ 06:15)  HR: 81 (04-19-24 @ 06:15) (69 - 90)  BP: 99/52 (04-19-24 @ 06:15) (92/49 - 115/66)  RR: 18 (04-19-24 @ 06:15) (12 - 20)  SpO2: 100% (04-19-24 @ 06:15) (95% - 100%)  Wt(kg): --  I&O's Summary    18 Apr 2024 07:01  -  19 Apr 2024 06:52  --------------------------------------------------------  IN: 995 mL / OUT: 2235 mL / NET: -1240 mL      CAPILLARY BLOOD GLUCOSE          MEDICATIONS  (STANDING):  heparin   Injectable 5000 Unit(s) SubCutaneous every 12 hours  ketorolac   Injectable 15 milliGRAM(s) IV Push every 6 hours  lactated ringers. 1000 milliLiter(s) (125 mL/Hr) IV Continuous <Continuous>  senna 2 Tablet(s) Oral at bedtime  simethicone 80 milliGRAM(s) Chew every 6 hours    MEDICATIONS  (PRN):  ondansetron Injectable 4 milliGRAM(s) IV Push every 6 hours PRN Nausea and/or Vomiting  oxyCODONE    IR 5 milliGRAM(s) Oral every 6 hours PRN Severe Pain (7 - 10)      Physical Exam:  Constitutional: NAD  CV: normal S1, S2  Chest: no bony tenderness over shoulders or ribs  Lungs: CTA b/l   Abdomen: soft, nondistended, diffusely tender throughout; binder adjusted by lowering over pelvis/hips  Incision: Pfannenstiel incision with Dermabond overlying, c/d/i  Extremities: SCDs in place, no posterior calf tenderness bilaterally or edema noted    LABS:  04-19    138    |  107    |  6<L>   ----------------------------<  100<H>  3.5     |  20<L>  |  0.68     Ca    7.4<L>      19 Apr 2024 05:27    TPro  5.0<L>  /  Alb  3.1<L>  /  TBili  0.6    /  DBili  x      /  AST  26     /  ALT  11     /  AlkPhos  32<L>  04-19          Urinalysis Basic - ( 19 Apr 2024 05:27 )    Color: x / Appearance: x / SG: x / pH: x  Gluc: 100 mg/dL / Ketone: x  / Bili: x / Urobili: x   Blood: x / Protein: x / Nitrite: x   Leuk Esterase: x / RBC: x / WBC x   Sq Epi: x / Non Sq Epi: x / Bacteria: x

## 2024-04-20 NOTE — DISCHARGE NOTE NURSING/CASE MANAGEMENT/SOCIAL WORK - NSDCPNINST_GEN_ALL_CORE
Report to MD/ED for fever, bleeding, pain that is not relieved w/ pain med., unable to urinate or have BM

## 2024-04-20 NOTE — PROGRESS NOTE ADULT - ASSESSMENT
A/P: 38y s/p abdominal myomectomy, chromotubation, and hysteroscopic myomectomy. Incision is healing well and patient is meeting appropriate post operative milestones.    Neuro: Pain well controlled with PO pain medication including Oxy prn. s/p Expiril injection.   CV: Hemodynamically stable. Monitor VS. Plan for 10am CBC  - H/H: 13.0/39.7->11.2/33.6->9.9/29.6->9.4/28.2->8.6  Pulm: Saturating well on room air.  Encourage OOB and incentive spirometer use.   GI: Reg diet, Anti-emetics PRN.  : voiding spontaneously  FEN: SLIV, replete electrolytes PRN  Heme: HSQ and DVT ppx w/ SCD's while in bed. Early ambulation, with assistance as needed.  ID: Afebrile  Dispo: plan for 10am CBC, then if remains stable, discharge to home    D/w attending, Dr. Ray Ann PGY2 A/P: 38y s/p abdominal myomectomy, chromotubation, and hysteroscopic myomectomy. Incision is healing well and patient is meeting appropriate post operative milestones.    Neuro: Pain well controlled with PO pain medication including Oxy prn. s/p Exparel injection.   CV: Hemodynamically stable. Monitor VS. Plan for 10am CBC  - H/H: 11.2/33.6->9.9/29.6->9.4/28.2->8.6  Pulm: Saturating well on room air.  Encourage OOB and incentive spirometer use.   GI: Reg diet, Anti-emetics PRN.  : voiding spontaneously  FEN: SLIV, replete electrolytes PRN  Heme: HSQ and DVT ppx w/ SCD's while in bed. Early ambulation, with assistance as needed.  ID: Afebrile  Dispo: plan for 10am CBC, then if remains stable, discharge to home    D/w attending, Dr. Ray Ann PGY2      late entry. agree with above findings. Pt was evaluated overnight at approx 11pm. Denies any complaints. Ambulating, voiding and eating regular food without any difficulty. Passed flatus. Pain is controlled. Ambulating, voiding and eating regular food without any difficulty. Reports heavy bleeding overnight but states minimal bleeding this morning. Incision c/d/i. VSS. 38y s/p EUA, abdominal myomectomy, chromotubation, and hysteroscopic myomectomy. POD1. EBL 250cc. VSS. meeting milestones. reports that she feels better just tired.   Cont POD care  Acute blood loss anemia secondary to surgery and fibroids. h/h stable. vss. cont iron supplements  Encourage ambulation and ISS  Anticipate discharge tomorrow  Discharge instructions and precautions discussed with patient   Lizet Bell MD MPH

## 2024-04-20 NOTE — DISCHARGE NOTE NURSING/CASE MANAGEMENT/SOCIAL WORK - PATIENT PORTAL LINK FT
You can access the FollowMyHealth Patient Portal offered by  by registering at the following website: [email or web address]. By joining [employer]’s FollowMyHealth portal, you will also be able to view your health information using other applications (apps) compatible with our system.

## 2024-04-20 NOTE — DISCHARGE NOTE NURSING/CASE MANAGEMENT/SOCIAL WORK - NSDCPEFALRISK_GEN_ALL_CORE
For information on Fall & Injury Prevention, visit: [email or web address] OR  [email or web address] OR  [email or web address]://www.cdc.gov/steadi/patient.html

## 2024-04-30 LAB — SURGICAL PATHOLOGY STUDY: SIGNIFICANT CHANGE UP

## 2024-05-01 ENCOUNTER — TRANSCRIPTION ENCOUNTER (OUTPATIENT)
Age: 39
End: 2024-05-01

## (undated) DEVICE — FOLEY TRAY 16FR 5CC LF UMETER CLOSED

## (undated) DEVICE — TRAP SPECIMEN SPUTUM 40CC

## (undated) DEVICE — SUT VICRYL 3-0 27" PS-2 UNDYED

## (undated) DEVICE — SOL IRR POUR H2O 500ML

## (undated) DEVICE — LABELS BLANK W PEN

## (undated) DEVICE — TUBING IRR SET FOR CYSTOSCOPY 77"

## (undated) DEVICE — SOL IRR POUR H2O 1500ML

## (undated) DEVICE — SUT VICRYL 3-0 27" SH UNDYED

## (undated) DEVICE — DRSG DERMABOND PRINEO 22CM

## (undated) DEVICE — ELCTR GROUNDING PAD ADULT COVIDIEN

## (undated) DEVICE — PACK GENERAL MINOR

## (undated) DEVICE — STAPLER SKIN VISI-STAT 35 WIDE

## (undated) DEVICE — Device

## (undated) DEVICE — ELCTR BOVIE TIP BLADE INSULATED 6.5" EDGE

## (undated) DEVICE — PACK GYN LAPAROSCOPY

## (undated) DEVICE — DRSG TELFA 3 X 8

## (undated) DEVICE — POSITIONER MATTRESS EGGCRATE

## (undated) DEVICE — LIGASURE IMPACT

## (undated) DEVICE — UTERINE MANIPULATOR CLINICAL INNOVATIONS CLEARVIEW 7CM

## (undated) DEVICE — MYOSURE SCOPE SEAL

## (undated) DEVICE — LIJ/LIA-EVICEL PUMP: Type: DURABLE MEDICAL EQUIPMENT

## (undated) DEVICE — POSITIONER STRAP ARMBOARD VELCRO TS-30

## (undated) DEVICE — SUT VICRYL 0 18" TIES UNDYED

## (undated) DEVICE — DRAPE LAPAROTOMY TRANSVERSE

## (undated) DEVICE — DRSG TEGADERM 4X4.75"

## (undated) DEVICE — DRAPE LAPAROTOMY W VELCRO CORD TABS

## (undated) DEVICE — POSITIONER FOAM EGG CRATE ULNAR 2PCS (PINK)

## (undated) DEVICE — PACK D&C

## (undated) DEVICE — GLV 7 PROTEXIS (WHITE)

## (undated) DEVICE — SUT PDS II 1 27" CT

## (undated) DEVICE — CANISTER DISPOSABLE THIN WALL 3000CC

## (undated) DEVICE — SUT PLAIN GUT 2-0 27" CT-1

## (undated) DEVICE — SOL IRR POUR NS 0.9% 1500ML

## (undated) DEVICE — VENODYNE/SCD SLEEVE CALF MEDIUM

## (undated) DEVICE — PACK MAJOR ABDOMINAL WITH LAP

## (undated) DEVICE — DRSG KERLIX ROLL 4.5"